# Patient Record
Sex: FEMALE | Race: WHITE | URBAN - METROPOLITAN AREA
[De-identification: names, ages, dates, MRNs, and addresses within clinical notes are randomized per-mention and may not be internally consistent; named-entity substitution may affect disease eponyms.]

---

## 2018-06-07 ENCOUNTER — NEW PATIENT (OUTPATIENT)
Dept: URBAN - METROPOLITAN AREA CLINIC 26 | Facility: CLINIC | Age: 73
End: 2018-06-07
Payer: MEDICARE

## 2018-06-07 DIAGNOSIS — H25.813 COMBINED FORMS OF AGE-RELATED CATARACT, BILATERAL: Primary | ICD-10-CM

## 2018-06-07 PROCEDURE — 92004 COMPRE OPH EXAM NEW PT 1/>: CPT | Performed by: OPTOMETRIST

## 2018-06-07 ASSESSMENT — VISUAL ACUITY
OD: 20/25
OS: 20/20

## 2018-06-07 ASSESSMENT — KERATOMETRY
OS: 44.38
OD: 44.25

## 2018-06-07 ASSESSMENT — INTRAOCULAR PRESSURE
OS: 16
OD: 16

## 2019-09-11 ENCOUNTER — FOLLOW UP ESTABLISHED (OUTPATIENT)
Dept: URBAN - METROPOLITAN AREA CLINIC 26 | Facility: CLINIC | Age: 74
End: 2019-09-11
Payer: MEDICARE

## 2019-09-11 DIAGNOSIS — H04.123 TEAR FILM INSUFFICIENCY OF BILATERAL LACRIMAL GLANDS: Primary | ICD-10-CM

## 2019-09-11 PROCEDURE — 92014 COMPRE OPH EXAM EST PT 1/>: CPT | Performed by: OPTOMETRIST

## 2019-09-11 ASSESSMENT — INTRAOCULAR PRESSURE
OD: 14
OS: 16

## 2019-09-11 ASSESSMENT — VISUAL ACUITY
OS: 20/20
OD: 20/20

## 2019-09-11 ASSESSMENT — KERATOMETRY
OD: 44.38
OS: 44.63

## 2022-06-13 ENCOUNTER — OFFICE VISIT (OUTPATIENT)
Dept: URBAN - METROPOLITAN AREA CLINIC 26 | Facility: CLINIC | Age: 77
End: 2022-06-13
Payer: OTHER MISCELLANEOUS

## 2022-06-13 DIAGNOSIS — H04.123 TEAR FILM INSUFFICIENCY OF BILATERAL LACRIMAL GLANDS: ICD-10-CM

## 2022-06-13 DIAGNOSIS — H52.4 PRESBYOPIA: ICD-10-CM

## 2022-06-13 DIAGNOSIS — H25.813 COMBINED FORMS OF AGE-RELATED CATARACT, BILATERAL: Primary | ICD-10-CM

## 2022-06-13 PROCEDURE — 92014 COMPRE OPH EXAM EST PT 1/>: CPT | Performed by: OPTOMETRIST

## 2022-06-13 ASSESSMENT — KERATOMETRY
OD: 44.38
OS: 44.38

## 2022-06-13 ASSESSMENT — INTRAOCULAR PRESSURE
OD: 16
OS: 16

## 2022-06-13 NOTE — IMPRESSION/PLAN
Impression: Tear film insufficiency of bilateral lacrimal glands Plan: Recommend art tears qid ou. (sample given today)

## 2022-06-13 NOTE — IMPRESSION/PLAN
Impression: Combined forms of age-related cataract, bilateral Plan: Discussed diagnosis in detail with patient. No treatment is required at this time. Will continue to observe condition and or symptoms. Call if [address] worsens.

## 2023-06-21 ENCOUNTER — OFFICE VISIT (OUTPATIENT)
Dept: URBAN - METROPOLITAN AREA CLINIC 26 | Facility: CLINIC | Age: 78
End: 2023-06-21
Payer: OTHER MISCELLANEOUS

## 2023-06-21 DIAGNOSIS — H52.4 PRESBYOPIA: ICD-10-CM

## 2023-06-21 DIAGNOSIS — H04.123 DRY EYE SYNDROME OF BILATERAL LACRIMAL GLANDS: ICD-10-CM

## 2023-06-21 DIAGNOSIS — H25.813 COMBINED FORMS OF AGE-RELATED CATARACT, BILATERAL: Primary | ICD-10-CM

## 2023-06-21 PROCEDURE — 92014 COMPRE OPH EXAM EST PT 1/>: CPT | Performed by: OPTOMETRIST

## 2023-06-21 ASSESSMENT — KERATOMETRY
OD: 44.13
OS: 44.50

## 2023-06-21 ASSESSMENT — INTRAOCULAR PRESSURE
OS: 16
OD: 16

## 2023-06-21 ASSESSMENT — VISUAL ACUITY
OD: 20/25
OS: 20/20

## 2023-06-21 NOTE — IMPRESSION/PLAN
Impression: Dry eye syndrome of bilateral lacrimal glands: H04.123.  Plan: Recommend art tears 1 gt BID OU

## 2024-07-03 ENCOUNTER — OFFICE VISIT (OUTPATIENT)
Dept: URBAN - METROPOLITAN AREA CLINIC 26 | Facility: CLINIC | Age: 79
End: 2024-07-03
Payer: OTHER MISCELLANEOUS

## 2024-07-03 DIAGNOSIS — H25.813 COMBINED FORMS OF AGE-RELATED CATARACT, BILATERAL: ICD-10-CM

## 2024-07-03 DIAGNOSIS — H04.123 DRY EYE SYNDROME OF BILATERAL LACRIMAL GLANDS: Primary | ICD-10-CM

## 2024-07-03 PROCEDURE — 92014 COMPRE OPH EXAM EST PT 1/>: CPT | Performed by: OPTOMETRIST

## 2024-07-03 ASSESSMENT — KERATOMETRY
OD: 44.75
OS: 44.25

## 2024-07-03 ASSESSMENT — INTRAOCULAR PRESSURE
OS: 13
OD: 12

## 2024-07-03 ASSESSMENT — VISUAL ACUITY
OS: 20/25
OD: 20/25